# Patient Record
Sex: FEMALE | Race: WHITE | URBAN - METROPOLITAN AREA
[De-identification: names, ages, dates, MRNs, and addresses within clinical notes are randomized per-mention and may not be internally consistent; named-entity substitution may affect disease eponyms.]

---

## 2021-01-11 ENCOUNTER — WEB ENCOUNTER (OUTPATIENT)
Dept: URBAN - METROPOLITAN AREA CLINIC 2 | Facility: CLINIC | Age: 71
End: 2021-01-11

## 2021-01-11 ENCOUNTER — OFFICE VISIT (OUTPATIENT)
Dept: URBAN - METROPOLITAN AREA CLINIC 2 | Facility: CLINIC | Age: 71
End: 2021-01-11
Payer: COMMERCIAL

## 2021-01-11 DIAGNOSIS — R11.2 NON-INTRACTABLE VOMITING WITH NAUSEA, UNSPECIFIED VOMITING TYPE: ICD-10-CM

## 2021-01-11 DIAGNOSIS — R19.4 CHANGE IN BOWEL HABITS: ICD-10-CM

## 2021-01-11 PROCEDURE — G8427 DOCREV CUR MEDS BY ELIG CLIN: HCPCS | Performed by: NURSE PRACTITIONER

## 2021-01-11 PROCEDURE — 1036F TOBACCO NON-USER: CPT | Performed by: NURSE PRACTITIONER

## 2021-01-11 PROCEDURE — G8420 CALC BMI NORM PARAMETERS: HCPCS | Performed by: NURSE PRACTITIONER

## 2021-01-11 PROCEDURE — 99204 OFFICE O/P NEW MOD 45 MIN: CPT | Performed by: NURSE PRACTITIONER

## 2021-01-11 RX ORDER — LOSARTAN POTASSIUM AND HYDROCHLOROTHIAZIDE 100; 12.5 MG/1; MG/1
1 TABLET TABLET, FILM COATED ORAL ONCE A DAY
Status: ACTIVE | COMMUNITY

## 2021-01-11 RX ORDER — RABEPRAZOLE SODIUM 20 MG/1
1 TABLET TABLET, DELAYED RELEASE ORAL ONCE A DAY
Qty: 90 TABLET | Refills: 1 | OUTPATIENT
Start: 2021-01-11

## 2021-01-11 RX ORDER — LOSARTAN POTASSIUM AND HYDROCHLOROTHIAZIDE 100; 25 MG/1; MG/1
1 TABLET TABLET, FILM COATED ORAL ONCE A DAY
Status: DISCONTINUED | COMMUNITY

## 2021-01-11 RX ORDER — PRAVASTATIN SODIUM 40 MG/1
1 TABLET TABLET ORAL ONCE A DAY
Status: ACTIVE | COMMUNITY

## 2021-01-11 NOTE — HPI-TODAY'S VISIT:
Pleasant 70 yr old female referred by Dr. Leora Maguire for constipation/diarrhea and abdominal pain. .   A copy of this document is being forwarded to the referring provider. Patient has hx of gerd and nausea. She has hx of paraesophageal hernia repair with mesh x2. 2010 and 2013. for 1 yr now, She will awaken vomiting then she is fine. This occurs every few monts. EGD with GI Specialist 2/2020.  Patient reports EGD was "fine but did have retained food. c/o sour burps and can burp for 2 days straight. he has taken nexium for years. prilosec did not work. she also has intermittent constipation then will have diarrhea. Weight is stable. No weight loss. Colonoscopy about 4 years ago and has hx of colon polyps.

## 2021-01-14 ENCOUNTER — TELEPHONE ENCOUNTER (OUTPATIENT)
Dept: URBAN - METROPOLITAN AREA CLINIC 2 | Facility: CLINIC | Age: 71
End: 2021-01-14

## 2021-01-28 ENCOUNTER — OFFICE VISIT (OUTPATIENT)
Dept: URBAN - METROPOLITAN AREA SURGERY CENTER 19 | Facility: SURGERY CENTER | Age: 71
End: 2021-01-28

## 2021-02-15 ENCOUNTER — TELEPHONE ENCOUNTER (OUTPATIENT)
Dept: URBAN - METROPOLITAN AREA CLINIC 92 | Facility: CLINIC | Age: 71
End: 2021-02-15

## 2021-02-15 PROBLEM — 408574004: Status: ACTIVE | Noted: 2021-02-15

## 2021-02-22 ENCOUNTER — TELEPHONE ENCOUNTER (OUTPATIENT)
Dept: URBAN - METROPOLITAN AREA CLINIC 126 | Facility: CLINIC | Age: 71
End: 2021-02-22

## 2021-03-08 ENCOUNTER — OFFICE VISIT (OUTPATIENT)
Dept: URBAN - METROPOLITAN AREA CLINIC 2 | Facility: CLINIC | Age: 71
End: 2021-03-08

## 2021-04-19 ENCOUNTER — OFFICE VISIT (OUTPATIENT)
Dept: URBAN - METROPOLITAN AREA CLINIC 2 | Facility: CLINIC | Age: 71
End: 2021-04-19
Payer: COMMERCIAL

## 2021-04-19 DIAGNOSIS — Z98.890 HISTORY OF NISSEN FUNDOPLICATION: ICD-10-CM

## 2021-04-19 DIAGNOSIS — K31.84 GASTROPARESIS: ICD-10-CM

## 2021-04-19 DIAGNOSIS — K44.9 HIATAL HERNIA: ICD-10-CM

## 2021-04-19 PROCEDURE — 99214 OFFICE O/P EST MOD 30 MIN: CPT | Performed by: NURSE PRACTITIONER

## 2021-04-19 RX ORDER — ERYTHROMYCIN 250 MG/1
AS DIRECTED TABLET, DELAYED RELEASE ORAL
Qty: 42 TABLET | Refills: 0 | OUTPATIENT
Start: 2021-04-19 | End: 2021-05-03

## 2021-04-19 RX ORDER — PRAVASTATIN SODIUM 40 MG/1
1 TABLET TABLET ORAL ONCE A DAY
Status: ACTIVE | COMMUNITY

## 2021-04-19 RX ORDER — LOSARTAN POTASSIUM AND HYDROCHLOROTHIAZIDE 100; 12.5 MG/1; MG/1
1 TABLET TABLET, FILM COATED ORAL ONCE A DAY
Status: ACTIVE | COMMUNITY

## 2021-04-19 RX ORDER — RABEPRAZOLE SODIUM 20 MG/1
1 TABLET TABLET, DELAYED RELEASE ORAL ONCE A DAY
Qty: 90 TABLET | Refills: 1 | Status: ACTIVE | COMMUNITY
Start: 2021-01-11

## 2021-04-19 NOTE — HPI-TODAY'S VISIT:
Pleasant 70 yr old female with evidence of gastroparesis on GES and retained tracer in esophagphagus. so Barium swallow ordered which noted esophageal dysmotility, spasm and likely loosened fundoplication. Since her last visit, she is managing symptoms with diet. she prefers to not have surgery at this time. She reports last EGD was actually 2/2020 for same symptoms which was notable for retained food.

## 2021-04-22 ENCOUNTER — TELEPHONE ENCOUNTER (OUTPATIENT)
Dept: URBAN - METROPOLITAN AREA CLINIC 98 | Facility: CLINIC | Age: 71
End: 2021-04-22

## 2021-05-05 ENCOUNTER — TELEPHONE ENCOUNTER (OUTPATIENT)
Dept: URBAN - METROPOLITAN AREA CLINIC 2 | Facility: CLINIC | Age: 71
End: 2021-05-05

## 2021-05-05 RX ORDER — RABEPRAZOLE SODIUM 20 MG/1
1 TABLET TABLET, DELAYED RELEASE ORAL ONCE A DAY
Qty: 90 TABLET | Refills: 1 | Status: ACTIVE | COMMUNITY
Start: 2021-01-11

## 2021-05-05 RX ORDER — METOCLOPRAMIDE 5 MG/1
1 TABLET BEFORE MEALS TABLET ORAL
Qty: 90 TABLET | OUTPATIENT
Start: 2021-05-05

## 2021-05-05 RX ORDER — PRAVASTATIN SODIUM 40 MG/1
1 TABLET TABLET ORAL ONCE A DAY
Status: ACTIVE | COMMUNITY

## 2021-05-05 RX ORDER — LOSARTAN POTASSIUM AND HYDROCHLOROTHIAZIDE 100; 12.5 MG/1; MG/1
1 TABLET TABLET, FILM COATED ORAL ONCE A DAY
Status: ACTIVE | COMMUNITY

## 2021-05-10 ENCOUNTER — TELEPHONE ENCOUNTER (OUTPATIENT)
Dept: URBAN - METROPOLITAN AREA CLINIC 2 | Facility: CLINIC | Age: 71
End: 2021-05-10

## 2021-05-10 RX ORDER — METOCLOPRAMIDE 5 MG/1
1 TABLET BEFORE MEALS TABLET ORAL
Qty: 90 TABLET
Start: 2021-05-05

## 2021-06-03 ENCOUNTER — OFFICE VISIT (OUTPATIENT)
Dept: URBAN - METROPOLITAN AREA CLINIC 2 | Facility: CLINIC | Age: 71
End: 2021-06-03
Payer: COMMERCIAL

## 2021-06-03 ENCOUNTER — DASHBOARD ENCOUNTERS (OUTPATIENT)
Age: 71
End: 2021-06-03

## 2021-06-03 DIAGNOSIS — K44.9 HIATAL HERNIA: ICD-10-CM

## 2021-06-03 DIAGNOSIS — Z98.890 HISTORY OF NISSEN FUNDOPLICATION: ICD-10-CM

## 2021-06-03 DIAGNOSIS — K31.84 GASTROPARESIS: ICD-10-CM

## 2021-06-03 PROBLEM — 235675006: Status: ACTIVE | Noted: 2021-02-15

## 2021-06-03 PROCEDURE — 99213 OFFICE O/P EST LOW 20 MIN: CPT | Performed by: INTERNAL MEDICINE

## 2021-06-03 RX ORDER — LOSARTAN POTASSIUM AND HYDROCHLOROTHIAZIDE 100; 12.5 MG/1; MG/1
1 TABLET TABLET, FILM COATED ORAL ONCE A DAY
Status: ACTIVE | COMMUNITY

## 2021-06-03 RX ORDER — METOCLOPRAMIDE 5 MG/1
1 TABLET TABLET ORAL
Qty: 270 TABLET | Refills: 1
Start: 2021-05-05

## 2021-06-03 RX ORDER — METOCLOPRAMIDE 5 MG/1
1 TABLET BEFORE MEALS TABLET ORAL
Qty: 90 TABLET | Status: ACTIVE | COMMUNITY
Start: 2021-05-05

## 2021-06-03 RX ORDER — RABEPRAZOLE SODIUM 20 MG/1
1 TABLET TABLET, DELAYED RELEASE ORAL ONCE A DAY
Qty: 90 TABLET | Refills: 1 | Status: ACTIVE | COMMUNITY
Start: 2021-01-11

## 2021-06-03 RX ORDER — PRAVASTATIN SODIUM 40 MG/1
1 TABLET TABLET ORAL ONCE A DAY
Status: ACTIVE | COMMUNITY

## 2021-06-03 NOTE — HPI-TODAY'S VISIT:
Very pleasant 70-year-old female here today for interval follow-up.  She does have esophageal dysmotility recurrent hiatal hernia and gastroparesis.  At her last visit we were going to start erythromycin but it was cost prohibitive so we sent Reglan.  She is doing very well she denies nausea or vomiting. bowels are moving well.  no diarrhea. Patient denies EPS symptoms.  She will stop medication if any occur.  She is moving to [place] in July.

## 2021-08-06 ENCOUNTER — TELEPHONE ENCOUNTER (OUTPATIENT)
Dept: URBAN - METROPOLITAN AREA CLINIC 2 | Facility: CLINIC | Age: 71
End: 2021-08-06

## 2021-08-06 RX ORDER — RABEPRAZOLE SODIUM 20 MG/1
1 TABLET TABLET, DELAYED RELEASE ORAL ONCE A DAY
Qty: 90 TABLET | Refills: 1
Start: 2021-01-11